# Patient Record
Sex: FEMALE | Race: WHITE | NOT HISPANIC OR LATINO | URBAN - METROPOLITAN AREA
[De-identification: names, ages, dates, MRNs, and addresses within clinical notes are randomized per-mention and may not be internally consistent; named-entity substitution may affect disease eponyms.]

---

## 2019-12-03 ENCOUNTER — WALK IN (OUTPATIENT)
Dept: URGENT CARE | Age: 44
End: 2019-12-03

## 2019-12-03 DIAGNOSIS — Z23 NEED FOR VACCINATION: Primary | ICD-10-CM

## 2019-12-03 PROCEDURE — 90471 IMMUNIZATION ADMIN: CPT | Performed by: NURSE PRACTITIONER

## 2019-12-03 PROCEDURE — 90686 IIV4 VACC NO PRSV 0.5 ML IM: CPT | Performed by: NURSE PRACTITIONER

## 2024-09-11 ENCOUNTER — HOSPITAL ENCOUNTER (OUTPATIENT)
Age: 49
Discharge: HOME | End: 2024-09-11
Payer: COMMERCIAL

## 2024-09-11 VITALS
DIASTOLIC BLOOD PRESSURE: 90 MMHG | RESPIRATION RATE: 20 BRPM | SYSTOLIC BLOOD PRESSURE: 137 MMHG | HEIGHT: 66 IN | TEMPERATURE: 99 F | OXYGEN SATURATION: 99 % | WEIGHT: 135 LBS | BODY MASS INDEX: 21.69 KG/M2 | HEART RATE: 108 BPM

## 2024-09-11 DIAGNOSIS — N89.8 VAGINAL DISCHARGE: Primary | ICD-10-CM

## 2024-09-11 DIAGNOSIS — R09.81 NASAL CONGESTION: ICD-10-CM

## 2024-09-11 LAB
B-HCG UR QL: NEGATIVE
GLUCOSE UR STRIP-MCNC: NEGATIVE MG/DL
KETONES UR STRIP-MCNC: >=160 MG/DL
NITRITE UR QL STRIP: NEGATIVE
PH UR STRIP: 6 [PH]
PROT UR STRIP-MCNC: 100 MG/DL
SARS-COV-2 RNA RESP QL NAA+PROBE: NOT DETECTED
SP GR UR STRIP: >=1.03
UROBILINOGEN UR STRIP-ACNC: <2 MG/DL

## 2024-09-11 PROCEDURE — 99204 OFFICE O/P NEW MOD 45 MIN: CPT | Performed by: NURSE PRACTITIONER

## 2024-09-11 PROCEDURE — U0002 COVID-19 LAB TEST NON-CDC: HCPCS | Performed by: NURSE PRACTITIONER

## 2024-09-11 PROCEDURE — 81002 URINALYSIS NONAUTO W/O SCOPE: CPT | Performed by: NURSE PRACTITIONER

## 2024-09-11 PROCEDURE — 81025 URINE PREGNANCY TEST: CPT | Performed by: NURSE PRACTITIONER

## 2024-09-11 RX ORDER — VALACYCLOVIR HYDROCHLORIDE 1 G/1
1000 TABLET, FILM COATED ORAL EVERY 12 HOURS
Qty: 20 TABLET | Refills: 0 | Status: SHIPPED | OUTPATIENT
Start: 2024-09-11 | End: 2024-09-21

## 2024-09-11 NOTE — DISCHARGE INSTRUCTIONS
Please start valacyclovir.  We will contact you if any of your cultures are positive.  Avoid intercourse during testing.  Follow-up with your OB/GYN tomorrow.  Your COVID swab was negative.

## 2024-09-11 NOTE — ED PROVIDER NOTES
Patient Seen in: [facility]      History     Chief Complaint   Patient presents with    Gynecologic Exam     I have multiple symptoms - tarted with headache, fatigue and body aches. No have had unusual volumes of  clear discharge and today bumps appeared in my genital area. - Entered by patient    Saray    Headache     Stated Complaint: Gynecologic Exam - I have multiple symptoms - tarted with headache, fatigue and*    Subjective:   This a 49-year-old female with a history of breast cancer.  Presents to immediate care with multiple complaints.  Reports since Sunday she has had a headache, nasal congestion, with chills, and nausea.  Patient states her last few days she has developed copious amounts of vaginal discharge and painful rash in her genital area.  She has been in a monogamous relationship with her  for 25 years.  No urinary symptoms.  No abdominal pain or vomiting.  No treatment attempted prior to arrival.    The history is provided by the patient.           Objective:   Past Medical History:    Breast cancer (HCC)    Surgery September 2015, Radiation October 2015    Chronic rhinitis              No pertinent past surgical history.              No pertinent social history.            Review of Systems   Constitutional:  Positive for chills and fatigue. Negative for fever.   HENT:  Positive for congestion and rhinorrhea. Negative for sore throat.    Respiratory:  Negative for cough, shortness of breath and wheezing.    Cardiovascular:  Negative for chest pain, palpitations and leg swelling.   Gastrointestinal:  Negative for abdominal pain, constipation, diarrhea, nausea and vomiting.   Genitourinary:  Positive for vaginal discharge. Negative for dysuria, flank pain, hematuria and vaginal bleeding.   Musculoskeletal:  Negative for back pain, neck pain and neck stiffness.   Skin:  Positive for rash.   Neurological:  Positive for headaches.   Hematological:  Does not bruise/bleed  easily.       Positive for stated Chief Complaint: Gynecologic Exam (I have multiple symptoms - tarted with headache, fatigue and body aches. No have had unusual volumes of  clear discharge and today bumps appeared in my genital area. - Entered by patient), Eval-G, and Headache    Other systems are as noted in HPI.  Constitutional and vital signs reviewed.      All other systems reviewed and negative except as noted above.    Physical Exam     ED Triage Vitals [09/11/24 0953]   /90   Pulse 108   Resp 20   Temp 99.1 °F (37.3 °C)   Temp src Temporal   SpO2 99 %   O2 Device None (Room air)       Current Vitals:   Vital Signs  BP: 137/90  Pulse: 108  Resp: 20  Temp: 99.1 °F (37.3 °C)  Temp src: Temporal    Oxygen Therapy  SpO2: 99 %  O2 Device: None (Room air)            Physical Exam  Vitals and nursing note reviewed. Exam conducted with a chaperone present.   Constitutional:       General: She is not in acute distress.     Appearance: Normal appearance. She is normal weight. She is not ill-appearing, toxic-appearing or diaphoretic.   HENT:      Head: Normocephalic and atraumatic.      Right Ear: External ear normal.      Left Ear: External ear normal.      Nose: Nose normal.      Mouth/Throat:      Mouth: Mucous membranes are moist.      Pharynx: Oropharynx is clear.   Eyes:      General:         Right eye: No discharge.         Left eye: No discharge.      Extraocular Movements: Extraocular movements intact.      Conjunctiva/sclera: Conjunctivae normal.   Cardiovascular:      Rate and Rhythm: Normal rate.   Pulmonary:      Effort: Pulmonary effort is normal.   Genitourinary:     Exam position: Supine.      Pubic Area: No rash or pubic lice.       Labia:         Right: Rash, tenderness and lesion present. No injury.         Left: Rash, tenderness and lesion present. No injury.       Vagina: No signs of injury and foreign body. Vaginal discharge present. No erythema, tenderness, bleeding, lesions or prolapsed  vaginal walls.      Cervix: Discharge, friability, erythema and cervical bleeding present. No cervical motion tenderness, lesion or eversion.   Musculoskeletal:      Cervical back: Neck supple.      Right lower leg: No edema.      Left lower leg: No edema.   Skin:     General: Skin is warm and dry.      Capillary Refill: Capillary refill takes less than 2 seconds.      Findings: No rash.   Neurological:      Mental Status: She is alert and oriented to person, place, and time.   Psychiatric:         Mood and Affect: Mood normal.         Behavior: Behavior normal.               ED Course     Labs Reviewed   [facility] POCT URINALYSIS DIPSTICK - Abnormal; Notable for the following components:       Result Value    Urine Clarity Cloudy (*)     Protein urine 100 (*)     Ketone, Urine >=160 (*)     Bilirubin, Urine Small (*)     Blood, Urine Small (*)     Leukocyte esterase urine Small (*)     All other components within normal limits   POCT PREGNANCY URINE - Normal   RAPID SARS-COV-2 BY PCR - Normal   HSV 1/2 SUBTYPE BY PCR (LESION-ONLY)   VAGINITIS VAGINOSIS PCR PANEL   CHLAMYDIA/GONOCOCCUS, JAYDEN   URINE CULTURE, ROUTINE             UA/UC, urine preg, vag swabs, hsv swab         MDM      Vital signs stable.  Patient is well-appearing and nontoxic looking.  Presents to immediate care for nasal congestion, headache, fatigue, and vaginal discharge with genital rash.    Differential diagnosis includes but is not limited to COVID, other viral illness, viral sinusitis UTI, pyelonephritis, PID, vaginitis, gonorrhea, chlamydia     Abdominal exam is benign.    UA appears uninfected.  Urine was sent for culture.  Urine pregnancy is negative.     Speculum pelvic exam performed with nursing staff chaperone.  There is noted vesicular rash on bilateral labia.  High suspicion for HSV 2.  Rapid COVID is negative.  Likely viral symptoms are related to HSV outbreak.    Copious amounts of yellowish discharge coming from the cervix.  Cervix is  erythemic and bleeding.  No obvious lesions.  No foreign body.    Vaginal swab sent off and pending.  Vaginal swabs and HSV swab sent off and pending.    DC home.  Rx given for valacyclovir.  Advised to abstain from intercourse during testing.  Follow-up with OB/GYN tomorrow as scheduled.  Reasons to return reviewed.  Patient verbalized understanding, and agreed plan of care.  All questions answered.                                     Medical Decision Making      Disposition and Plan     Clinical Impression:  1. Vaginal discharge    2. Nasal congestion         Disposition:  Discharge  9/11/2024 10:56 am    Follow-up:  Malina Quigley MD  [facility] [address] [place] [id number] [phone]    In 1 day            Medications Prescribed:  Current Discharge Medication List        START taking these medications    Details   valACYclovir 1 G Oral Tab Take 1 tablet (1,000 mg total) by mouth every 12 (twelve) hours for 10 days.  Qty: 20 tablet, Refills: 0

## 2024-09-12 LAB
BV BACTERIA DNA VAG QL NAA+PROBE: NEGATIVE
C GLABRATA DNA VAG QL NAA+PROBE: NEGATIVE
C KRUSEI DNA VAG QL NAA+PROBE: NEGATIVE
C TRACH DNA SPEC QL NAA+PROBE: NEGATIVE
CANDIDA DNA VAG QL NAA+PROBE: NEGATIVE
N GONORRHOEA DNA SPEC QL NAA+PROBE: NEGATIVE
T VAGINALIS DNA VAG QL NAA+PROBE: NEGATIVE

## 2024-09-14 LAB
HSV 1 NAA: POSITIVE
HSV 1 NAA: POSITIVE
HSV 2 NAA: NEGATIVE
HSV 2 NAA: NEGATIVE